# Patient Record
Sex: FEMALE | Race: WHITE
[De-identification: names, ages, dates, MRNs, and addresses within clinical notes are randomized per-mention and may not be internally consistent; named-entity substitution may affect disease eponyms.]

---

## 2018-10-02 ENCOUNTER — HOSPITAL ENCOUNTER (OUTPATIENT)
Dept: HOSPITAL 39 - LAB.O | Age: 58
End: 2018-10-02
Attending: NURSE PRACTITIONER
Payer: COMMERCIAL

## 2018-10-02 DIAGNOSIS — R42: ICD-10-CM

## 2018-10-02 DIAGNOSIS — R10.32: Primary | ICD-10-CM

## 2018-10-02 DIAGNOSIS — M62.81: ICD-10-CM

## 2018-10-03 ENCOUNTER — HOSPITAL ENCOUNTER (OUTPATIENT)
Dept: HOSPITAL 39 - CT | Age: 58
End: 2018-10-03
Attending: NURSE PRACTITIONER
Payer: COMMERCIAL

## 2018-10-03 DIAGNOSIS — R10.32: Primary | ICD-10-CM

## 2018-10-03 NOTE — CT
EXAM DESCRIPTION:  CT ABDOMEN AND PELVIS WITHOUT AND WITH

CONTRAST



CLINICAL HISTORY: ACUTE ABD PAIN, LOWER BACK PAIN, NAUSEA

EVALUATE FOR BOWEL OR OVARIAN ETIOLOGY



COMPARISON: None Available.



TECHNIQUE: CT of the abdomen and pelvis are performed prior to

and during IV bolus administration of routine adult dose of

nonionic iodinated contrast. Oral contrast media is administered

as well.



FINDINGS: 



CT abdomen



The lung bases are clear of infiltrate.

Liver is normal in size and parenchymal appearance. Gallbladder

is surgically absent.

Spleen, pancreas, and kidneys are unremarkable on precontrast

images.

There is no lymphadenopathy, inflammation, or free fluid

observed. After IV contrast, normal enhancement of the liver

spleen pancreas and kidneys is noted. Normal enhancing aorta with

no aneurysm.



CT pelvis



No inflammation is seen around the cecum or terminal ileum or

sigmoid colon. Appendix is small but otherwise normal. No stones

are seen in the distal ureters or bladder. Normal pelvic small

bowel loops. No fracture or lytic lesion of the osseous

structures. Postcontrast images show normal enhancement of the

pelvic vessels. Uterus and right ovary are normal in size.

Prominent vessels in the left more than right adnexal regions

suggest pelvic venous congestion or pelvic AV

malformation/fistula. Prominent gonadal veins are seen

bilaterally left larger than right. Left ovary is not identified.



IMPRESSION:



No acute upper abdominal process.



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

venous fistula/malformation.



This exam was performed according to our departmental

dose-optimization program, which includes automated exposure

control, adjustment of the mA and/or kV according to patient size

and/or use of iterative reconstruction technique.



Electronically signed by:  Kwadwo Luaghlin MD  10/3/2018 12:32

PM CDT Workstation: [id number]

## 2020-10-28 ENCOUNTER — HOSPITAL ENCOUNTER (OUTPATIENT)
Dept: HOSPITAL 39 - RAD | Age: 60
End: 2020-10-28
Attending: FAMILY MEDICINE
Payer: COMMERCIAL

## 2020-10-28 DIAGNOSIS — R51.9: ICD-10-CM

## 2020-10-28 DIAGNOSIS — G31.1: Primary | ICD-10-CM

## 2020-10-28 NOTE — CT
EXAM DESCRIPTION: 



CT-Head



CLINICAL HISTORY: 



OCCIPITAL HEADACHE



COMPARISON: 



None available



TECHNIQUE: 



Multiple axial images of the head without contrast. Multiplanar

reformatted images.



This exam was performed according to our departmental

dose-optimization program, which includes automated exposure

control, adjustment of the mA and/or kV according to patient size

and/or use of iterative reconstruction technique.



FINDINGS: 



There is no CT evidence of intracranial hemorrhage, mass effect,

or large territory infarction. Mild generalized volume loss. Mild

patchy supratentorial white matter hypodensities.



There are no abnormal extra-axial fluid collections.



Vascular structures are unremarkable.



There is no acute calvarial defect.



The visualized paranasal sinuses and the mastoids are clear.



IMPRESSION: 



1. No CT evidence of an acute intracranial abnormality. Recommend

follow-up MRI if symptoms persist.

2. Mild senescent changes.



Electronically signed by:  Sukumar Blue MD  10/28/2020 11:07 AM

CDT Workstation: [id number]